# Patient Record
Sex: FEMALE | Race: BLACK OR AFRICAN AMERICAN
[De-identification: names, ages, dates, MRNs, and addresses within clinical notes are randomized per-mention and may not be internally consistent; named-entity substitution may affect disease eponyms.]

---

## 2017-04-16 ENCOUNTER — HOSPITAL ENCOUNTER (EMERGENCY)
Dept: HOSPITAL 12 - ER | Age: 57
Discharge: HOME | End: 2017-04-16
Payer: COMMERCIAL

## 2017-04-16 VITALS — WEIGHT: 190 LBS | HEIGHT: 64 IN | BODY MASS INDEX: 32.44 KG/M2
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
## 2017-04-16 DIAGNOSIS — M62.838: ICD-10-CM
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
## 2017-04-16 DIAGNOSIS — S40.011A: Primary | ICD-10-CM

## 2017-04-16 DIAGNOSIS — S70.01XA: ICD-10-CM
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
## 2017-04-16 PROCEDURE — 99284 EMERGENCY DEPT VISIT MOD MDM: CPT

## 2017-04-16 PROCEDURE — 73030 X-RAY EXAM OF SHOULDER: CPT

## 2017-04-16 PROCEDURE — 96372 THER/PROPH/DIAG INJ SC/IM: CPT

## 2017-04-16 PROCEDURE — A4663 DIALYSIS BLOOD PRESSURE CUFF: HCPCS

## 2017-04-16 PROCEDURE — 73502 X-RAY EXAM HIP UNI 2-3 VIEWS: CPT

## 2017-04-22 ENCOUNTER — HOSPITAL ENCOUNTER (EMERGENCY)
Dept: HOSPITAL 12 - ER | Age: 57
LOS: 1 days | Discharge: HOME | End: 2017-04-23
Payer: COMMERCIAL

## 2017-04-22 VITALS — WEIGHT: 190 LBS | BODY MASS INDEX: 32.44 KG/M2 | HEIGHT: 64 IN

## 2017-04-22 DIAGNOSIS — R30.0: ICD-10-CM

## 2017-04-22 DIAGNOSIS — R50.9: Primary | ICD-10-CM

## 2017-04-22 DIAGNOSIS — R10.9: ICD-10-CM

## 2017-04-22 DIAGNOSIS — R51: ICD-10-CM

## 2017-04-22 DIAGNOSIS — Z90.710: ICD-10-CM

## 2017-04-22 LAB
BASOPHILS # BLD AUTO: 0 K/UL (ref 0–8)
BASOPHILS NFR BLD AUTO: 0.2 % (ref 0–2)
EOSINOPHIL # BLD AUTO: 0 K/UL (ref 0–0.7)
EOSINOPHIL NFR BLD AUTO: 0.4 % (ref 0–7)
ERYTHROCYTE [DISTWIDTH] IN BLOOD BY AUTOMATED COUNT: 13.9 % (ref 12.3–17.7)
HCT VFR BLD AUTO: 36.8 % (ref 31.2–41.9)
HGB BLD-MCNC: 12.8 G/DL (ref 10.9–14.3)
LYMPHOCYTES # BLD AUTO: 0.7 K/UL (ref 20–40)
LYMPHOCYTES NFR BLD AUTO: 9.6 % (ref 20.5–51.5)
MCH RBC QN AUTO: 28.5 UUG (ref 24.7–32.8)
MCHC RBC AUTO-ENTMCNC: 35 G/DL (ref 32.3–35.6)
MCV RBC AUTO: 81.9 FL (ref 75.5–95.3)
MONOCYTES # BLD AUTO: 0.2 K/UL (ref 2–10)
MONOCYTES NFR BLD AUTO: 2.6 % (ref 0–11)
NEUTROPHILS # BLD AUTO: 6.5 K/UL (ref 1.8–8.9)
NEUTROPHILS NFR BLD AUTO: 87.2 % (ref 38.5–71.5)
PLATELET # BLD AUTO: 282 K/UL (ref 179–408)
RBC # BLD AUTO: 4.5 MIL/UL (ref 3.63–4.92)
WBC # BLD AUTO: 7.4 K/UL (ref 3.8–11.8)

## 2017-04-22 PROCEDURE — A4663 DIALYSIS BLOOD PRESSURE CUFF: HCPCS

## 2017-04-22 NOTE — NUR
PT IN THE ROOM, STATED HAD  FEVER TODAY AND WAS NOT FEELING WELL,CALLED 911 AND 
WAS STABLE . PT STILL  NOT FEELING WELL SO THEY CAME TO ER.

## 2017-04-23 VITALS — SYSTOLIC BLOOD PRESSURE: 130 MMHG | DIASTOLIC BLOOD PRESSURE: 62 MMHG

## 2017-04-23 LAB
ALBUMIN SERPL BCG-MCNC: 3.7 G/DL (ref 3.4–5)
ALP SERPL-CCNC: 161 U/L (ref 50–136)
ALT SERPL W/O P-5'-P-CCNC: 64 U/L (ref 14–59)
APPEARANCE UR: CLEAR
AST SERPL-CCNC: 62 U/L (ref 15–37)
BILIRUB DIRECT SERPL-MCNC: < 0.1 MG/DL (ref 0–0.2)
BILIRUB SERPL-MCNC: 0.7 MG/DL (ref 0.2–1)
BILIRUB UR QL STRIP: NEGATIVE
BUN SERPL-MCNC: 19 MG/DL (ref 7–18)
CALCIUM SERPL-MCNC: 9.7 MG/DL (ref 8.5–10.1)
CHLORIDE SERPL-SCNC: 103 MMOL/L (ref 98–107)
CO2 SERPL-SCNC: 25 MMOL/L (ref 21–32)
COLOR UR: YELLOW
CREAT SERPL-MCNC: 0.9 MG/DL (ref 0.6–1.3)
DEPRECATED SQUAMOUS URNS QL MICRO: (no result) /HPF
GFR SERPLBLD BASED ON 1.73 SQ M-ARVRAT: 78 ML/MIN (ref 60–?)
GLUCOSE SERPL-MCNC: 107 MG/DL (ref 74–106)
GLUCOSE UR STRIP-MCNC: NEGATIVE MG/DL
HGB UR QL STRIP: NEGATIVE
KETONES UR STRIP-MCNC: NEGATIVE MG/DL
LEUKOCYTE ESTERASE UR QL STRIP: NEGATIVE
NITRITE UR QL STRIP: NEGATIVE
PH UR STRIP: 6.5 [PH] (ref 5–8)
POTASSIUM SERPL-SCNC: 4.9 MMOL/L (ref 3.5–5.1)
PROT UR QL STRIP: NEGATIVE
RBC #/AREA URNS HPF: (no result) /HPF (ref 0–3)
SODIUM SERPL-SCNC: 139 MMOL/L (ref 136–145)
SP GR UR STRIP: 1.02 (ref 1–1.03)
UROBILINOGEN UR STRIP-MCNC: 0.2 E.U./DL
WBC #/AREA URNS HPF: (no result) /HPF
WBC #/AREA URNS HPF: (no result) /HPF (ref 0–3)
WS STN SPEC: 7.7 G/DL (ref 6.4–8.2)

## 2017-04-23 NOTE — NUR
ANTIBIOTICS GIVEN IV WITHOUT ANY UNTOWARD REACTION.ACI AND AMA FORM  SIGNED BY 
PT, ASSISTED TO DRESS UP,AND WALKED TO THE CAR STEADY  GAIT, FAMILY 
DRIVING...GOING HOME.

## 2017-04-23 NOTE — NUR
URINE  SENT TO LAB AND RESULTS IN.  CAME TO SPEAK TO PT RE-RESULTS,PT REFUSED 
SPINAL TESTING. ANTIBIOTICS WILL BE GIVEN BEFORE GOING HOME, FLUIDS CONTINUES 
AS ORDERED.

## 2017-04-25 ENCOUNTER — HOSPITAL ENCOUNTER (EMERGENCY)
Dept: HOSPITAL 12 - ER | Age: 57
Discharge: HOME | End: 2017-04-25
Payer: COMMERCIAL

## 2017-04-25 VITALS — HEIGHT: 64 IN | BODY MASS INDEX: 34.15 KG/M2 | WEIGHT: 200 LBS

## 2017-04-25 DIAGNOSIS — R51: ICD-10-CM

## 2017-04-25 DIAGNOSIS — M54.9: ICD-10-CM

## 2017-04-25 DIAGNOSIS — Z90.710: ICD-10-CM

## 2017-04-25 DIAGNOSIS — J11.1: Primary | ICD-10-CM

## 2017-04-25 LAB
ALBUMIN SERPL BCG-MCNC: 3.9 G/DL (ref 3.4–5)
ALP SERPL-CCNC: 181 U/L (ref 50–136)
ALT SERPL W/O P-5'-P-CCNC: 116 U/L (ref 14–59)
APPEARANCE UR: CLEAR
AST SERPL-CCNC: 59 U/L (ref 15–37)
BASOPHILS # BLD AUTO: 0 K/UL (ref 0–8)
BASOPHILS NFR BLD AUTO: 0.6 % (ref 0–2)
BILIRUB DIRECT SERPL-MCNC: 0.1 MG/DL (ref 0–0.2)
BILIRUB SERPL-MCNC: 0.4 MG/DL (ref 0.2–1)
BILIRUB UR QL STRIP: NEGATIVE
BUN SERPL-MCNC: 9 MG/DL (ref 7–18)
CALCIUM SERPL-MCNC: 9.4 MG/DL (ref 8.5–10.1)
CHLORIDE SERPL-SCNC: 103 MMOL/L (ref 98–107)
CO2 SERPL-SCNC: 28 MMOL/L (ref 21–32)
COLOR UR: YELLOW
CREAT SERPL-MCNC: 0.9 MG/DL (ref 0.6–1.3)
DEPRECATED SQUAMOUS URNS QL MICRO: (no result) /HPF
EOSINOPHIL # BLD AUTO: 0.1 K/UL (ref 0–0.7)
EOSINOPHIL NFR BLD AUTO: 1.9 % (ref 0–7)
ERYTHROCYTE [DISTWIDTH] IN BLOOD BY AUTOMATED COUNT: 14 % (ref 12.3–17.7)
GFR SERPLBLD BASED ON 1.73 SQ M-ARVRAT: 78 ML/MIN (ref 60–?)
GLUCOSE SERPL-MCNC: 119 MG/DL (ref 74–106)
GLUCOSE UR STRIP-MCNC: NEGATIVE MG/DL
HCT VFR BLD AUTO: 38.3 % (ref 31.2–41.9)
HGB BLD-MCNC: 12.8 G/DL (ref 10.9–14.3)
HGB UR QL STRIP: NEGATIVE
KETONES UR STRIP-MCNC: NEGATIVE MG/DL
LEUKOCYTE ESTERASE UR QL STRIP: NEGATIVE
LYMPHOCYTES # BLD AUTO: 2 K/UL (ref 20–40)
LYMPHOCYTES NFR BLD AUTO: 28.9 % (ref 20.5–51.5)
MCH RBC QN AUTO: 28.3 UUG (ref 24.7–32.8)
MCHC RBC AUTO-ENTMCNC: 34 G/DL (ref 32.3–35.6)
MCV RBC AUTO: 84.5 FL (ref 75.5–95.3)
MONOCYTES # BLD AUTO: 0.4 K/UL (ref 2–10)
MONOCYTES NFR BLD AUTO: 5.3 % (ref 0–11)
NEUTROPHILS # BLD AUTO: 4.4 K/UL (ref 1.8–8.9)
NEUTROPHILS NFR BLD AUTO: 63.3 % (ref 38.5–71.5)
NITRITE UR QL STRIP: NEGATIVE
PH UR STRIP: 6 [PH] (ref 5–8)
PLATELET # BLD AUTO: 264 K/UL (ref 179–408)
POTASSIUM SERPL-SCNC: 4 MMOL/L (ref 3.5–5.1)
PROT UR QL STRIP: NEGATIVE
RBC # BLD AUTO: 4.53 MIL/UL (ref 3.63–4.92)
RBC #/AREA URNS HPF: (no result) /HPF (ref 0–3)
SODIUM SERPL-SCNC: 141 MMOL/L (ref 136–145)
SP GR UR STRIP: 1.01 (ref 1–1.03)
UROBILINOGEN UR STRIP-MCNC: 0.2 E.U./DL
WBC # BLD AUTO: 6.9 K/UL (ref 3.8–11.8)
WBC #/AREA URNS HPF: (no result) /HPF
WBC #/AREA URNS HPF: (no result) /HPF (ref 0–3)
WS STN SPEC: 8 G/DL (ref 6.4–8.2)

## 2017-04-25 PROCEDURE — A4663 DIALYSIS BLOOD PRESSURE CUFF: HCPCS

## 2017-04-25 NOTE — NUR
IV removed.  Catheter intact and site benign. Pressure and 4x4 gauze applied to 
site. No bleeding noted.

## 2017-04-25 NOTE — NUR
Patient discharged to home in stable conditon.  Written and verbal after care 
instructions given to patient and patient's family friend. Patient & friend 
verbalized understanding of instructions.

## 2018-03-29 ENCOUNTER — HOSPITAL ENCOUNTER (EMERGENCY)
Dept: HOSPITAL 12 - ER | Age: 58
Discharge: HOME | End: 2018-03-29
Payer: COMMERCIAL

## 2018-03-29 VITALS — WEIGHT: 189 LBS | BODY MASS INDEX: 31.49 KG/M2 | HEIGHT: 65 IN

## 2018-03-29 VITALS — DIASTOLIC BLOOD PRESSURE: 73 MMHG | SYSTOLIC BLOOD PRESSURE: 120 MMHG

## 2018-03-29 DIAGNOSIS — Z90.710: ICD-10-CM

## 2018-03-29 DIAGNOSIS — S49.92XA: Primary | ICD-10-CM
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
## 2018-03-29 LAB
ALP SERPL-CCNC: 169 U/L (ref 50–136)
ALT SERPL W/O P-5'-P-CCNC: 66 U/L (ref 14–59)
AST SERPL-CCNC: 53 U/L (ref 15–37)
BASOPHILS # BLD AUTO: 0.1 K/UL (ref 0–8)
BASOPHILS NFR BLD AUTO: 0.8 % (ref 0–2)
BILIRUB DIRECT SERPL-MCNC: 0.1 MG/DL (ref 0–0.2)
BILIRUB SERPL-MCNC: 0.3 MG/DL (ref 0.2–1)
BUN SERPL-MCNC: 22 MG/DL (ref 7–18)
CHLORIDE SERPL-SCNC: 104 MMOL/L (ref 98–107)
CO2 SERPL-SCNC: 26 MMOL/L (ref 21–32)
CREAT SERPL-MCNC: 1 MG/DL (ref 0.6–1.3)
EOSINOPHIL # BLD AUTO: 0.2 K/UL (ref 0–0.7)
EOSINOPHIL NFR BLD AUTO: 3.4 % (ref 0–7)
GLUCOSE SERPL-MCNC: 131 MG/DL (ref 74–106)
HCT VFR BLD AUTO: 36 % (ref 31.2–41.9)
HGB BLD-MCNC: 12 G/DL (ref 10.9–14.3)
LYMPHOCYTES # BLD AUTO: 3.3 K/UL (ref 20–40)
LYMPHOCYTES NFR BLD AUTO: 46 % (ref 20.5–51.5)
MCH RBC QN AUTO: 28.4 UUG (ref 24.7–32.8)
MCHC RBC AUTO-ENTMCNC: 33 G/DL (ref 32.3–35.6)
MCV RBC AUTO: 85.6 FL (ref 75.5–95.3)
MONOCYTES # BLD AUTO: 0.4 K/UL (ref 2–10)
MONOCYTES NFR BLD AUTO: 6.1 % (ref 0–11)
NEUTROPHILS # BLD AUTO: 3.1 K/UL (ref 1.8–8.9)
NEUTROPHILS NFR BLD AUTO: 43.7 % (ref 38.5–71.5)
PLATELET # BLD AUTO: 303 K/UL (ref 179–408)
POTASSIUM SERPL-SCNC: 3.9 MMOL/L (ref 3.5–5.1)
RBC # BLD AUTO: 4.2 MIL/UL (ref 3.63–4.92)
WBC # BLD AUTO: 7.1 K/UL (ref 3.8–11.8)
WS STN SPEC: 7.5 G/DL (ref 6.4–8.2)

## 2018-03-29 PROCEDURE — A4663 DIALYSIS BLOOD PRESSURE CUFF: HCPCS

## 2018-03-29 NOTE — NUR
Patient discharged to home in stable conditon.  Written and verbal after care 
instructions given. 

Patient verbalizes understanding of instructions. IV removed, catheter intact. 
pressure applies. No bleeding noted at site.

## 2019-02-08 ENCOUNTER — HOSPITAL ENCOUNTER (EMERGENCY)
Dept: HOSPITAL 54 - ER | Age: 59
Discharge: HOME | End: 2019-02-08
Payer: COMMERCIAL

## 2019-02-08 VITALS — HEIGHT: 64 IN | WEIGHT: 200 LBS | BODY MASS INDEX: 34.15 KG/M2

## 2019-02-08 VITALS — DIASTOLIC BLOOD PRESSURE: 85 MMHG | SYSTOLIC BLOOD PRESSURE: 141 MMHG

## 2019-02-08 DIAGNOSIS — Z90.710: ICD-10-CM

## 2019-02-08 DIAGNOSIS — M54.41: Primary | ICD-10-CM
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
## 2019-02-08 DIAGNOSIS — Z90.49: ICD-10-CM
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
## 2019-02-08 PROCEDURE — 99283 EMERGENCY DEPT VISIT LOW MDM: CPT

## 2019-02-08 PROCEDURE — 96372 THER/PROPH/DIAG INJ SC/IM: CPT

## 2019-04-02 ENCOUNTER — HOSPITAL ENCOUNTER (EMERGENCY)
Dept: HOSPITAL 54 - ER | Age: 59
LOS: 1 days | Discharge: HOME | End: 2019-04-03
Payer: MEDICARE

## 2019-04-02 VITALS — HEIGHT: 64 IN | BODY MASS INDEX: 33.97 KG/M2 | WEIGHT: 199 LBS
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
## 2019-04-02 DIAGNOSIS — M54.42: ICD-10-CM

## 2019-04-02 DIAGNOSIS — S16.1XXA: Primary | ICD-10-CM
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
## 2019-04-02 DIAGNOSIS — M62.838: ICD-10-CM

## 2019-04-02 DIAGNOSIS — R51: ICD-10-CM

## 2019-04-02 DIAGNOSIS — Z90.49: ICD-10-CM
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
## 2019-04-02 DIAGNOSIS — Z90.710: ICD-10-CM

## 2019-04-02 PROCEDURE — 99283 EMERGENCY DEPT VISIT LOW MDM: CPT

## 2019-04-02 PROCEDURE — 96372 THER/PROPH/DIAG INJ SC/IM: CPT

## 2019-04-03 VITALS — DIASTOLIC BLOOD PRESSURE: 75 MMHG | SYSTOLIC BLOOD PRESSURE: 134 MMHG

## 2019-05-24 ENCOUNTER — HOSPITAL ENCOUNTER (EMERGENCY)
Dept: HOSPITAL 54 - ER | Age: 59
Discharge: HOME | End: 2019-05-24
Payer: MEDICARE

## 2019-05-24 VITALS — SYSTOLIC BLOOD PRESSURE: 137 MMHG | DIASTOLIC BLOOD PRESSURE: 77 MMHG

## 2019-05-24 VITALS — BODY MASS INDEX: 34.15 KG/M2 | HEIGHT: 64 IN | WEIGHT: 200 LBS

## 2019-05-24 DIAGNOSIS — Z90.710: ICD-10-CM

## 2019-05-24 DIAGNOSIS — Z90.49: ICD-10-CM

## 2019-05-24 DIAGNOSIS — M54.42: Primary | ICD-10-CM

## 2019-05-24 PROCEDURE — 96372 THER/PROPH/DIAG INJ SC/IM: CPT

## 2019-05-24 PROCEDURE — 99283 EMERGENCY DEPT VISIT LOW MDM: CPT

## 2019-06-11 ENCOUNTER — HOSPITAL ENCOUNTER (EMERGENCY)
Dept: HOSPITAL 54 - ER | Age: 59
Discharge: HOME | End: 2019-06-11
Payer: MEDICARE

## 2019-06-11 VITALS — HEIGHT: 64 IN | BODY MASS INDEX: 37.56 KG/M2 | WEIGHT: 220 LBS

## 2019-06-11 VITALS — SYSTOLIC BLOOD PRESSURE: 138 MMHG | DIASTOLIC BLOOD PRESSURE: 72 MMHG

## 2019-06-11 DIAGNOSIS — G89.29: ICD-10-CM

## 2019-06-11 DIAGNOSIS — E66.9: ICD-10-CM

## 2019-06-11 DIAGNOSIS — M54.41: Primary | ICD-10-CM

## 2019-06-11 DIAGNOSIS — F13.20: ICD-10-CM

## 2019-06-11 DIAGNOSIS — M54.42: ICD-10-CM

## 2019-06-11 PROCEDURE — 96372 THER/PROPH/DIAG INJ SC/IM: CPT

## 2019-06-11 PROCEDURE — 99283 EMERGENCY DEPT VISIT LOW MDM: CPT

## 2019-08-21 ENCOUNTER — HOSPITAL ENCOUNTER (EMERGENCY)
Dept: HOSPITAL 54 - ER | Age: 59
Discharge: HOME | End: 2019-08-21
Payer: MEDICARE

## 2019-08-21 VITALS — BODY MASS INDEX: 34.15 KG/M2 | WEIGHT: 200 LBS | HEIGHT: 64 IN

## 2019-08-21 VITALS — SYSTOLIC BLOOD PRESSURE: 121 MMHG | DIASTOLIC BLOOD PRESSURE: 84 MMHG
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
## 2019-08-21 DIAGNOSIS — Z90.710: ICD-10-CM

## 2019-08-21 DIAGNOSIS — M54.2: ICD-10-CM

## 2019-08-21 DIAGNOSIS — Z90.49: ICD-10-CM
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
## 2019-08-21 DIAGNOSIS — S09.8XXA: Primary | ICD-10-CM

## 2019-08-21 DIAGNOSIS — M25.511: ICD-10-CM
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
## 2019-10-16 NOTE — NUR
PT ADVISED TO GO TO HER DR AS SOON AS POSS FOR FOLLOW-UP AND IF SYMPTOMS 
CONTINUES OR WORSE TO COME BACK.PT VERBALIZED UNDERSTANDING BEFORE GOING HOME. General

## 2020-03-29 ENCOUNTER — HOSPITAL ENCOUNTER (EMERGENCY)
Dept: HOSPITAL 54 - ER | Age: 60
Discharge: HOME | End: 2020-03-29
Payer: MEDICARE

## 2020-03-29 VITALS — BODY MASS INDEX: 41.15 KG/M2 | WEIGHT: 241 LBS | HEIGHT: 64 IN

## 2020-03-29 VITALS — DIASTOLIC BLOOD PRESSURE: 92 MMHG | SYSTOLIC BLOOD PRESSURE: 149 MMHG

## 2020-03-29 DIAGNOSIS — Z90.710: ICD-10-CM

## 2020-03-29 DIAGNOSIS — Z90.49: ICD-10-CM

## 2020-03-29 DIAGNOSIS — M54.30: Primary | ICD-10-CM

## 2020-03-29 DIAGNOSIS — G89.29: ICD-10-CM

## 2020-03-29 PROCEDURE — 96372 THER/PROPH/DIAG INJ SC/IM: CPT

## 2020-03-29 PROCEDURE — 99284 EMERGENCY DEPT VISIT MOD MDM: CPT

## 2020-04-18 ENCOUNTER — HOSPITAL ENCOUNTER (EMERGENCY)
Dept: HOSPITAL 54 - ER | Age: 60
Discharge: HOME | End: 2020-04-18
Payer: MEDICARE

## 2020-04-18 VITALS — DIASTOLIC BLOOD PRESSURE: 95 MMHG | SYSTOLIC BLOOD PRESSURE: 152 MMHG

## 2020-04-18 VITALS — HEIGHT: 64 IN | WEIGHT: 200 LBS | BODY MASS INDEX: 34.15 KG/M2

## 2020-04-18 DIAGNOSIS — J06.9: Primary | ICD-10-CM

## 2020-04-18 DIAGNOSIS — Z90.49: ICD-10-CM

## 2020-04-18 DIAGNOSIS — Z90.710: ICD-10-CM

## 2020-07-16 ENCOUNTER — HOSPITAL ENCOUNTER (EMERGENCY)
Dept: HOSPITAL 54 - ER | Age: 60
Discharge: HOME | End: 2020-07-16
Payer: MEDICARE

## 2020-07-16 VITALS — SYSTOLIC BLOOD PRESSURE: 153 MMHG | DIASTOLIC BLOOD PRESSURE: 78 MMHG

## 2020-07-16 VITALS — HEIGHT: 64 IN | WEIGHT: 200 LBS | BODY MASS INDEX: 34.15 KG/M2

## 2020-07-16 DIAGNOSIS — Z98.890: ICD-10-CM

## 2020-07-16 DIAGNOSIS — Z90.49: ICD-10-CM

## 2020-07-16 DIAGNOSIS — A09: Primary | ICD-10-CM

## 2020-07-16 DIAGNOSIS — R11.2: ICD-10-CM

## 2020-07-16 DIAGNOSIS — Z60.2: ICD-10-CM

## 2020-07-16 LAB
ALBUMIN SERPL BCP-MCNC: 3.7 G/DL (ref 3.4–5)
ALP SERPL-CCNC: 153 U/L (ref 46–116)
ALT SERPL W P-5'-P-CCNC: 50 U/L (ref 12–78)
AST SERPL W P-5'-P-CCNC: 35 U/L (ref 15–37)
BASOPHILS # BLD AUTO: 0 /CMM (ref 0–0.2)
BASOPHILS NFR BLD AUTO: 0.4 % (ref 0–2)
BILIRUB DIRECT SERPL-MCNC: 0.2 MG/DL (ref 0–0.2)
BILIRUB SERPL-MCNC: 0.5 MG/DL (ref 0.2–1)
BUN SERPL-MCNC: 15 MG/DL (ref 7–18)
CALCIUM SERPL-MCNC: 8.7 MG/DL (ref 8.5–10.1)
CHLORIDE SERPL-SCNC: 99 MMOL/L (ref 98–107)
CO2 SERPL-SCNC: 26 MMOL/L (ref 21–32)
CREAT SERPL-MCNC: 1.1 MG/DL (ref 0.6–1.3)
EOSINOPHIL NFR BLD AUTO: 0.3 % (ref 0–6)
GLUCOSE SERPL-MCNC: 134 MG/DL (ref 74–106)
HCT VFR BLD AUTO: 40 % (ref 33–45)
HGB BLD-MCNC: 13.1 G/DL (ref 11.5–14.8)
LIPASE SERPL-CCNC: 66 U/L (ref 73–393)
LYMPHOCYTES NFR BLD AUTO: 1.1 /CMM (ref 0.8–4.8)
LYMPHOCYTES NFR BLD AUTO: 12.9 % (ref 20–44)
MCHC RBC AUTO-ENTMCNC: 33 G/DL (ref 31–36)
MCV RBC AUTO: 86 FL (ref 82–100)
MONOCYTES NFR BLD AUTO: 0.7 /CMM (ref 0.1–1.3)
MONOCYTES NFR BLD AUTO: 8.1 % (ref 2–12)
NEUTROPHILS # BLD AUTO: 6.5 /CMM (ref 1.8–8.9)
NEUTROPHILS NFR BLD AUTO: 78.3 % (ref 43–81)
PLATELET # BLD AUTO: 269 /CMM (ref 150–450)
POTASSIUM SERPL-SCNC: 3.6 MMOL/L (ref 3.5–5.1)
PROT SERPL-MCNC: 7.6 G/DL (ref 6.4–8.2)
RBC # BLD AUTO: 4.61 MIL/UL (ref 4–5.2)
SODIUM SERPL-SCNC: 134 MMOL/L (ref 136–145)
WBC NRBC COR # BLD AUTO: 8.3 K/UL (ref 4.3–11)

## 2020-07-16 PROCEDURE — 83690 ASSAY OF LIPASE: CPT

## 2020-07-16 PROCEDURE — 85025 COMPLETE CBC W/AUTO DIFF WBC: CPT

## 2020-07-16 PROCEDURE — 96375 TX/PRO/DX INJ NEW DRUG ADDON: CPT

## 2020-07-16 PROCEDURE — 74176 CT ABD & PELVIS W/O CONTRAST: CPT

## 2020-07-16 PROCEDURE — 99284 EMERGENCY DEPT VISIT MOD MDM: CPT

## 2020-07-16 PROCEDURE — 80048 BASIC METABOLIC PNL TOTAL CA: CPT

## 2020-07-16 PROCEDURE — 96374 THER/PROPH/DIAG INJ IV PUSH: CPT

## 2020-07-16 PROCEDURE — 80076 HEPATIC FUNCTION PANEL: CPT

## 2020-07-16 RX ADMIN — DEXTROSE MONOHYDRATE ONE MG: 50 INJECTION, SOLUTION INTRAVENOUS at 11:47

## 2020-07-16 RX ADMIN — HYDROMORPHONE HYDROCHLORIDE ONE MG: 1 INJECTION, SOLUTION INTRAMUSCULAR; INTRAVENOUS; SUBCUTANEOUS at 13:32

## 2020-07-16 RX ADMIN — SODIUM CHLORIDE ONE ML: 9 INJECTION, SOLUTION INTRAVENOUS at 11:46

## 2020-07-16 SDOH — SOCIAL STABILITY - SOCIAL INSECURITY: PROBLEMS RELATED TO LIVING ALONE: Z60.2

## 2021-05-30 ENCOUNTER — HOSPITAL ENCOUNTER (EMERGENCY)
Dept: HOSPITAL 54 - ER | Age: 61
Discharge: HOME | End: 2021-05-30
Payer: MEDICARE

## 2021-05-30 VITALS — SYSTOLIC BLOOD PRESSURE: 128 MMHG | DIASTOLIC BLOOD PRESSURE: 73 MMHG

## 2021-05-30 VITALS — BODY MASS INDEX: 39.27 KG/M2 | WEIGHT: 230 LBS | HEIGHT: 64 IN

## 2021-05-30 DIAGNOSIS — R35.0: ICD-10-CM

## 2021-05-30 DIAGNOSIS — R14.0: ICD-10-CM

## 2021-05-30 DIAGNOSIS — Z90.49: ICD-10-CM

## 2021-05-30 DIAGNOSIS — Z79.899: ICD-10-CM

## 2021-05-30 DIAGNOSIS — R10.30: Primary | ICD-10-CM

## 2021-05-30 DIAGNOSIS — Z60.2: ICD-10-CM

## 2021-05-30 DIAGNOSIS — Z98.890: ICD-10-CM

## 2021-05-30 LAB
ALBUMIN SERPL BCP-MCNC: 3.6 G/DL (ref 3.4–5)
ALP SERPL-CCNC: 132 U/L (ref 46–116)
ALT SERPL W P-5'-P-CCNC: 52 U/L (ref 12–78)
AST SERPL W P-5'-P-CCNC: 32 U/L (ref 15–37)
BASOPHILS # BLD AUTO: 0 /CMM (ref 0–0.2)
BASOPHILS NFR BLD AUTO: 0.5 % (ref 0–2)
BILIRUB DIRECT SERPL-MCNC: 0.1 MG/DL (ref 0–0.2)
BILIRUB SERPL-MCNC: 0.4 MG/DL (ref 0.2–1)
BILIRUB UR QL STRIP: NEGATIVE
BUN SERPL-MCNC: 18 MG/DL (ref 7–18)
CALCIUM SERPL-MCNC: 9.1 MG/DL (ref 8.5–10.1)
CHLORIDE SERPL-SCNC: 103 MMOL/L (ref 98–107)
CO2 SERPL-SCNC: 27 MMOL/L (ref 21–32)
COLOR UR: YELLOW
CREAT SERPL-MCNC: 0.9 MG/DL (ref 0.6–1.3)
EOSINOPHIL NFR BLD AUTO: 2.9 % (ref 0–6)
GLUCOSE SERPL-MCNC: 93 MG/DL (ref 74–106)
GLUCOSE UR STRIP-MCNC: NEGATIVE MG/DL
HCT VFR BLD AUTO: 38 % (ref 33–45)
HGB BLD-MCNC: 12.3 G/DL (ref 11.5–14.8)
LEUKOCYTE ESTERASE UR QL STRIP: NEGATIVE
LIPASE SERPL-CCNC: 72 U/L (ref 73–393)
LYMPHOCYTES NFR BLD AUTO: 3.2 /CMM (ref 0.8–4.8)
LYMPHOCYTES NFR BLD AUTO: 42.8 % (ref 20–44)
MCHC RBC AUTO-ENTMCNC: 33 G/DL (ref 31–36)
MCV RBC AUTO: 88 FL (ref 82–100)
MONOCYTES NFR BLD AUTO: 0.7 /CMM (ref 0.1–1.3)
MONOCYTES NFR BLD AUTO: 9.7 % (ref 2–12)
NEUTROPHILS # BLD AUTO: 3.3 /CMM (ref 1.8–8.9)
NEUTROPHILS NFR BLD AUTO: 44.1 % (ref 43–81)
NITRITE UR QL STRIP: NEGATIVE
PH UR STRIP: 6.5 [PH] (ref 5–8)
PLATELET # BLD AUTO: 330 /CMM (ref 150–450)
POTASSIUM SERPL-SCNC: 3.9 MMOL/L (ref 3.5–5.1)
PROT SERPL-MCNC: 7.5 G/DL (ref 6.4–8.2)
PROT UR QL STRIP: NEGATIVE MG/DL
RBC # BLD AUTO: 4.28 MIL/UL (ref 4–5.2)
SODIUM SERPL-SCNC: 140 MMOL/L (ref 136–145)
UROBILINOGEN UR STRIP-MCNC: 0.2 EU/DL
WBC NRBC COR # BLD AUTO: 7.6 K/UL (ref 4.3–11)

## 2021-05-30 PROCEDURE — 99285 EMERGENCY DEPT VISIT HI MDM: CPT

## 2021-05-30 PROCEDURE — 96374 THER/PROPH/DIAG INJ IV PUSH: CPT

## 2021-05-30 PROCEDURE — 81003 URINALYSIS AUTO W/O SCOPE: CPT

## 2021-05-30 PROCEDURE — 80076 HEPATIC FUNCTION PANEL: CPT

## 2021-05-30 PROCEDURE — 84703 CHORIONIC GONADOTROPIN ASSAY: CPT

## 2021-05-30 PROCEDURE — 36415 COLL VENOUS BLD VENIPUNCTURE: CPT

## 2021-05-30 PROCEDURE — 74177 CT ABD & PELVIS W/CONTRAST: CPT

## 2021-05-30 PROCEDURE — 83690 ASSAY OF LIPASE: CPT

## 2021-05-30 PROCEDURE — 96361 HYDRATE IV INFUSION ADD-ON: CPT

## 2021-05-30 PROCEDURE — 83605 ASSAY OF LACTIC ACID: CPT

## 2021-05-30 PROCEDURE — 85025 COMPLETE CBC W/AUTO DIFF WBC: CPT

## 2021-05-30 PROCEDURE — 80048 BASIC METABOLIC PNL TOTAL CA: CPT

## 2021-05-30 SDOH — SOCIAL STABILITY - SOCIAL INSECURITY: PROBLEMS RELATED TO LIVING ALONE: Z60.2

## 2021-11-09 ENCOUNTER — HOSPITAL ENCOUNTER (EMERGENCY)
Dept: HOSPITAL 54 - ER | Age: 61
Discharge: HOME | End: 2021-11-09
Payer: MEDICARE

## 2021-11-09 VITALS — WEIGHT: 190 LBS | HEIGHT: 64 IN | BODY MASS INDEX: 32.44 KG/M2

## 2021-11-09 VITALS — SYSTOLIC BLOOD PRESSURE: 127 MMHG | DIASTOLIC BLOOD PRESSURE: 82 MMHG

## 2021-11-09 DIAGNOSIS — Z60.2: ICD-10-CM
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
## 2021-11-09 DIAGNOSIS — Z79.899: ICD-10-CM
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
## 2021-11-09 DIAGNOSIS — Z90.49: ICD-10-CM
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
## 2021-11-09 DIAGNOSIS — S89.81XA: Primary | ICD-10-CM
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
## 2021-11-09 DIAGNOSIS — Z98.890: ICD-10-CM

## 2021-11-09 PROCEDURE — 96372 THER/PROPH/DIAG INJ SC/IM: CPT

## 2021-11-09 PROCEDURE — 29505 APPLICATION LONG LEG SPLINT: CPT

## 2021-11-09 PROCEDURE — 73564 X-RAY EXAM KNEE 4 OR MORE: CPT

## 2021-11-09 PROCEDURE — 99284 EMERGENCY DEPT VISIT MOD MDM: CPT

## 2021-11-09 SDOH — SOCIAL STABILITY - SOCIAL INSECURITY: PROBLEMS RELATED TO LIVING ALONE: Z60.2

## 2023-03-14 ENCOUNTER — HOSPITAL ENCOUNTER (EMERGENCY)
Dept: HOSPITAL 54 - ER | Age: 63
Discharge: HOME | End: 2023-03-14
Payer: MEDICARE

## 2023-03-14 VITALS — SYSTOLIC BLOOD PRESSURE: 159 MMHG | DIASTOLIC BLOOD PRESSURE: 70 MMHG

## 2023-03-14 VITALS — BODY MASS INDEX: 32.44 KG/M2 | WEIGHT: 190 LBS | HEIGHT: 64 IN

## 2023-03-14 DIAGNOSIS — J40: Primary | ICD-10-CM

## 2023-03-14 DIAGNOSIS — Z79.899: ICD-10-CM

## 2023-03-14 DIAGNOSIS — Z60.2: ICD-10-CM

## 2023-03-14 DIAGNOSIS — Z90.49: ICD-10-CM

## 2023-03-14 PROCEDURE — 96372 THER/PROPH/DIAG INJ SC/IM: CPT

## 2023-03-14 PROCEDURE — 99283 EMERGENCY DEPT VISIT LOW MDM: CPT

## 2023-03-14 PROCEDURE — 94640 AIRWAY INHALATION TREATMENT: CPT

## 2023-03-14 PROCEDURE — 71045 X-RAY EXAM CHEST 1 VIEW: CPT

## 2023-03-14 SDOH — SOCIAL STABILITY - SOCIAL INSECURITY: PROBLEMS RELATED TO LIVING ALONE: Z60.2

## 2023-08-07 ENCOUNTER — HOSPITAL ENCOUNTER (INPATIENT)
Dept: HOSPITAL 54 - ER | Age: 63
LOS: 3 days | Discharge: HOME HEALTH | DRG: 69 | End: 2023-08-10
Attending: NURSE PRACTITIONER | Admitting: NURSE PRACTITIONER
Payer: MEDICARE

## 2023-08-07 VITALS — WEIGHT: 240 LBS | BODY MASS INDEX: 38.57 KG/M2 | HEIGHT: 66 IN

## 2023-08-07 DIAGNOSIS — Z86.73: ICD-10-CM

## 2023-08-07 DIAGNOSIS — M54.32: ICD-10-CM

## 2023-08-07 DIAGNOSIS — Z90.49: ICD-10-CM

## 2023-08-07 DIAGNOSIS — Z79.899: ICD-10-CM

## 2023-08-07 DIAGNOSIS — R29.702: ICD-10-CM

## 2023-08-07 DIAGNOSIS — E78.5: ICD-10-CM

## 2023-08-07 DIAGNOSIS — G89.4: ICD-10-CM

## 2023-08-07 DIAGNOSIS — K59.00: ICD-10-CM

## 2023-08-07 DIAGNOSIS — E66.01: ICD-10-CM

## 2023-08-07 DIAGNOSIS — Z90.711: ICD-10-CM

## 2023-08-07 DIAGNOSIS — M54.31: ICD-10-CM

## 2023-08-07 DIAGNOSIS — Z79.51: ICD-10-CM

## 2023-08-07 DIAGNOSIS — G45.9: Primary | ICD-10-CM

## 2023-08-07 LAB
APTT PPP: 23.2 SEC (ref 24.3–34.3)
BASOPHILS # BLD AUTO: 0.1 K/UL (ref 0–0.2)
BASOPHILS NFR BLD AUTO: 1.2 % (ref 0–2)
BUN SERPL-MCNC: 17 MG/DL (ref 7–18)
CALCIUM SERPL-MCNC: 9.8 MG/DL (ref 8.5–10.1)
CHLORIDE SERPL-SCNC: 104 MMOL/L (ref 98–107)
CO2 SERPL-SCNC: 26 MMOL/L (ref 21–32)
CREAT SERPL-MCNC: 0.9 MG/DL (ref 0.6–1.3)
EOSINOPHIL # BLD AUTO: 0.3 K/UL (ref 0–0.7)
EOSINOPHIL NFR BLD AUTO: 3.1 % (ref 0–6)
ERYTHROCYTE [DISTWIDTH] IN BLOOD BY AUTOMATED COUNT: 14.6 % (ref 11.5–15)
GLUCOSE SERPL-MCNC: 115 MG/DL (ref 74–106)
HCT VFR BLD AUTO: 39 % (ref 33–45)
HGB BLD-MCNC: 12.8 G/DL (ref 11.5–14.8)
INR PPP: 0.93 (ref 0.91–1.1)
LYMPHOCYTES NFR BLD AUTO: 3.5 K/UL (ref 0.8–4.8)
LYMPHOCYTES NFR BLD AUTO: 41.1 % (ref 20–44)
MCH RBC QN AUTO: 28 PG (ref 26–33)
MCHC RBC AUTO-ENTMCNC: 33 G/DL (ref 31–36)
MCV RBC AUTO: 85 FL (ref 82–100)
MONOCYTES NFR BLD AUTO: 0.6 K/UL (ref 0.1–1.3)
MONOCYTES NFR BLD AUTO: 7.1 % (ref 2–12)
NEUTROPHILS # BLD AUTO: 4 K/UL (ref 1.8–8.9)
NEUTROPHILS NFR BLD AUTO: 47.5 % (ref 43–81)
PLATELET # BLD AUTO: 348 K/UL (ref 150–450)
POTASSIUM SERPL-SCNC: 4 MMOL/L (ref 3.5–5.1)
PROTHROMBIN TIME: 9.8 SECS (ref 9.2–11.1)
RBC # BLD AUTO: 4.53 MIL/UL (ref 4–5.2)
SODIUM SERPL-SCNC: 142 MMOL/L (ref 136–145)
WBC NRBC COR # BLD AUTO: 8.4 K/UL (ref 4.3–11)

## 2023-08-07 PROCEDURE — G0378 HOSPITAL OBSERVATION PER HR: HCPCS

## 2023-08-08 VITALS — TEMPERATURE: 97.3 F | SYSTOLIC BLOOD PRESSURE: 113 MMHG | OXYGEN SATURATION: 100 % | DIASTOLIC BLOOD PRESSURE: 50 MMHG

## 2023-08-08 VITALS — OXYGEN SATURATION: 99 % | DIASTOLIC BLOOD PRESSURE: 67 MMHG | SYSTOLIC BLOOD PRESSURE: 132 MMHG | TEMPERATURE: 97.9 F

## 2023-08-08 VITALS — DIASTOLIC BLOOD PRESSURE: 62 MMHG | SYSTOLIC BLOOD PRESSURE: 113 MMHG | TEMPERATURE: 97.3 F | OXYGEN SATURATION: 99 %

## 2023-08-08 VITALS — DIASTOLIC BLOOD PRESSURE: 59 MMHG | TEMPERATURE: 98 F | SYSTOLIC BLOOD PRESSURE: 136 MMHG | OXYGEN SATURATION: 100 %

## 2023-08-08 VITALS — TEMPERATURE: 98.1 F | DIASTOLIC BLOOD PRESSURE: 81 MMHG | SYSTOLIC BLOOD PRESSURE: 137 MMHG | OXYGEN SATURATION: 100 %

## 2023-08-08 VITALS — OXYGEN SATURATION: 100 % | TEMPERATURE: 98.1 F | DIASTOLIC BLOOD PRESSURE: 81 MMHG | SYSTOLIC BLOOD PRESSURE: 137 MMHG

## 2023-08-08 VITALS — TEMPERATURE: 97.7 F | SYSTOLIC BLOOD PRESSURE: 128 MMHG | OXYGEN SATURATION: 98 % | DIASTOLIC BLOOD PRESSURE: 65 MMHG

## 2023-08-08 LAB
AMPHETAMINES UR QL: NEGATIVE
APPEARANCE UR: CLEAR
BARBITURATES UR QL SCN: NEGATIVE
BASOPHILS # BLD AUTO: 0 K/UL (ref 0–0.2)
BASOPHILS NFR BLD AUTO: 0.5 % (ref 0–2)
BENZODIAZ UR QL SCN: NEGATIVE
BILIRUB UR QL STRIP: NEGATIVE
BUN SERPL-MCNC: 17 MG/DL (ref 7–18)
CALCIUM SERPL-MCNC: 9.3 MG/DL (ref 8.5–10.1)
CANNABINOIDS UR QL SCN: NEGATIVE
CHLORIDE SERPL-SCNC: 103 MMOL/L (ref 98–107)
CHOLEST SERPL-MCNC: 281 MG/DL (ref ?–200)
CO2 SERPL-SCNC: 26 MMOL/L (ref 21–32)
COCAINE UR QL SCN: NEGATIVE
COLOR UR: YELLOW
CREAT SERPL-MCNC: 0.8 MG/DL (ref 0.6–1.3)
EOSINOPHIL # BLD AUTO: 0.2 K/UL (ref 0–0.7)
EOSINOPHIL NFR BLD AUTO: 3.1 % (ref 0–6)
ERYTHROCYTE [DISTWIDTH] IN BLOOD BY AUTOMATED COUNT: 14.3 % (ref 11.5–15)
GLUCOSE SERPL-MCNC: 108 MG/DL (ref 74–106)
GLUCOSE UR STRIP-MCNC: NEGATIVE MG/DL
HCT VFR BLD AUTO: 37 % (ref 33–45)
HDLC SERPL-MCNC: 69 MG/DL (ref 40–60)
HGB BLD-MCNC: 12 G/DL (ref 11.5–14.8)
HGB UR QL STRIP: NEGATIVE ERY/UL
KETONES UR STRIP-MCNC: NEGATIVE MG/DL
LDLC SERPL DIRECT ASSAY-MCNC: 174 MG/DL (ref 0–99)
LEUKOCYTE ESTERASE UR QL STRIP: NEGATIVE
LYMPHOCYTES NFR BLD AUTO: 3.2 K/UL (ref 0.8–4.8)
LYMPHOCYTES NFR BLD AUTO: 41.9 % (ref 20–44)
MCH RBC QN AUTO: 28 PG (ref 26–33)
MCHC RBC AUTO-ENTMCNC: 33 G/DL (ref 31–36)
MCV RBC AUTO: 86 FL (ref 82–100)
MONOCYTES NFR BLD AUTO: 0.5 K/UL (ref 0.1–1.3)
MONOCYTES NFR BLD AUTO: 7.1 % (ref 2–12)
NEUTROPHILS # BLD AUTO: 3.6 K/UL (ref 1.8–8.9)
NEUTROPHILS NFR BLD AUTO: 47.4 % (ref 43–81)
NITRITE UR QL STRIP: NEGATIVE
OPIATES UR QL SCN: POSITIVE
PCP UR QL SCN: NEGATIVE
PH UR STRIP: 6 [PH] (ref 5–8)
PLATELET # BLD AUTO: 314 K/UL (ref 150–450)
POTASSIUM SERPL-SCNC: 4.2 MMOL/L (ref 3.5–5.1)
PROT UR QL STRIP: NEGATIVE MG/DL
RBC # BLD AUTO: 4.26 MIL/UL (ref 4–5.2)
SODIUM SERPL-SCNC: 140 MMOL/L (ref 136–145)
SP GR UR STRIP: 1.02 (ref 1–1.03)
TRIGL SERPL-MCNC: 105 MG/DL (ref 30–150)
TSH SERPL DL<=0.005 MIU/L-ACNC: 2.26 UIU/ML (ref 0.36–3.74)
UROBILINOGEN UR STRIP-MCNC: 0.2 EU/DL
WBC NRBC COR # BLD AUTO: 7.6 K/UL (ref 4.3–11)

## 2023-08-08 RX ADMIN — Medication SCH EACH: at 11:38

## 2023-08-08 RX ADMIN — Medication PRN TAB: at 03:54

## 2023-08-08 RX ADMIN — Medication SCH EACH: at 11:41

## 2023-08-08 RX ADMIN — SIMVASTATIN SCH MG: 20 TABLET, FILM COATED ORAL at 21:36

## 2023-08-08 RX ADMIN — LACTULOSE SCH G: 10 SOLUTION ORAL at 21:36

## 2023-08-08 RX ADMIN — Medication SCH EACH: at 05:57

## 2023-08-08 RX ADMIN — Medication SCH EACH: at 17:09

## 2023-08-08 RX ADMIN — Medication SCH EACH: at 21:50

## 2023-08-08 RX ADMIN — Medication PRN TAB: at 16:31

## 2023-08-08 RX ADMIN — Medication PRN TAB: at 09:44

## 2023-08-08 RX ADMIN — LACTULOSE SCH G: 10 SOLUTION ORAL at 16:27

## 2023-08-08 RX ADMIN — METHOCARBAMOL TABLETS SCH MG: 750 TABLET, COATED ORAL at 16:27

## 2023-08-08 RX ADMIN — Medication PRN TAB: at 23:02

## 2023-08-09 VITALS — DIASTOLIC BLOOD PRESSURE: 67 MMHG | TEMPERATURE: 98 F | OXYGEN SATURATION: 98 % | SYSTOLIC BLOOD PRESSURE: 130 MMHG

## 2023-08-09 VITALS — OXYGEN SATURATION: 100 % | DIASTOLIC BLOOD PRESSURE: 59 MMHG | TEMPERATURE: 97.4 F | SYSTOLIC BLOOD PRESSURE: 126 MMHG

## 2023-08-09 VITALS — SYSTOLIC BLOOD PRESSURE: 129 MMHG | OXYGEN SATURATION: 97 % | DIASTOLIC BLOOD PRESSURE: 75 MMHG | TEMPERATURE: 98 F

## 2023-08-09 VITALS — OXYGEN SATURATION: 100 % | SYSTOLIC BLOOD PRESSURE: 137 MMHG | TEMPERATURE: 97.9 F | DIASTOLIC BLOOD PRESSURE: 63 MMHG

## 2023-08-09 VITALS — OXYGEN SATURATION: 98 % | DIASTOLIC BLOOD PRESSURE: 78 MMHG | TEMPERATURE: 98.3 F | SYSTOLIC BLOOD PRESSURE: 143 MMHG

## 2023-08-09 RX ADMIN — Medication PRN TAB: at 13:50

## 2023-08-09 RX ADMIN — TEMAZEPAM PRN MG: 15 CAPSULE ORAL at 00:21

## 2023-08-09 RX ADMIN — Medication SCH EACH: at 06:59

## 2023-08-09 RX ADMIN — Medication SCH EACH: at 06:00

## 2023-08-09 RX ADMIN — LACTULOSE SCH G: 10 SOLUTION ORAL at 10:00

## 2023-08-09 RX ADMIN — Medication SCH EACH: at 21:27

## 2023-08-09 RX ADMIN — LACTULOSE SCH G: 10 SOLUTION ORAL at 21:55

## 2023-08-09 RX ADMIN — SIMVASTATIN SCH MG: 20 TABLET, FILM COATED ORAL at 21:05

## 2023-08-09 RX ADMIN — Medication SCH EACH: at 16:59

## 2023-08-09 RX ADMIN — Medication SCH EACH: at 12:00

## 2023-08-09 RX ADMIN — Medication SCH EACH: at 12:39

## 2023-08-09 RX ADMIN — LACTULOSE SCH G: 10 SOLUTION ORAL at 16:45

## 2023-08-09 RX ADMIN — Medication SCH EACH: at 00:00

## 2023-08-09 RX ADMIN — TEMAZEPAM PRN MG: 15 CAPSULE ORAL at 22:14

## 2023-08-09 RX ADMIN — ASPIRIN 81 MG SCH MG: 81 TABLET ORAL at 08:44

## 2023-08-09 RX ADMIN — LACTULOSE SCH G: 10 SOLUTION ORAL at 04:54

## 2023-08-09 RX ADMIN — METHOCARBAMOL TABLETS SCH MG: 750 TABLET, COATED ORAL at 08:44

## 2023-08-09 RX ADMIN — Medication PRN TAB: at 22:15

## 2023-08-09 RX ADMIN — METHOCARBAMOL TABLETS SCH MG: 750 TABLET, COATED ORAL at 16:45

## 2023-08-10 VITALS — TEMPERATURE: 98 F | SYSTOLIC BLOOD PRESSURE: 118 MMHG | OXYGEN SATURATION: 99 % | DIASTOLIC BLOOD PRESSURE: 56 MMHG

## 2023-08-10 RX ADMIN — Medication SCH EACH: at 06:36

## 2023-08-10 RX ADMIN — LACTULOSE SCH G: 10 SOLUTION ORAL at 05:18

## 2023-08-10 RX ADMIN — LACTULOSE SCH G: 10 SOLUTION ORAL at 09:02

## 2023-08-10 RX ADMIN — ASPIRIN 81 MG SCH MG: 81 TABLET ORAL at 09:02

## 2023-08-10 RX ADMIN — Medication PRN TAB: at 09:09

## 2023-08-10 RX ADMIN — METHOCARBAMOL TABLETS SCH MG: 750 TABLET, COATED ORAL at 09:02

## 2024-04-16 ENCOUNTER — HOSPITAL ENCOUNTER (EMERGENCY)
Dept: HOSPITAL 54 - ER | Age: 64
Discharge: HOME | End: 2024-04-16
Payer: MEDICARE

## 2024-04-16 VITALS — HEIGHT: 64 IN | BODY MASS INDEX: 40.97 KG/M2 | WEIGHT: 240 LBS

## 2024-04-16 VITALS — DIASTOLIC BLOOD PRESSURE: 70 MMHG | SYSTOLIC BLOOD PRESSURE: 128 MMHG | TEMPERATURE: 98.3 F | OXYGEN SATURATION: 98 %

## 2024-04-16 DIAGNOSIS — Z60.2: ICD-10-CM

## 2024-04-16 DIAGNOSIS — J32.9: Primary | ICD-10-CM

## 2024-04-16 DIAGNOSIS — Z90.49: ICD-10-CM

## 2024-04-16 DIAGNOSIS — R11.2: ICD-10-CM

## 2024-04-16 DIAGNOSIS — Z79.899: ICD-10-CM

## 2024-04-16 DIAGNOSIS — K85.90: ICD-10-CM

## 2024-04-16 LAB
ALBUMIN SERPL BCP-MCNC: 3.9 G/DL (ref 3.4–5)
ALP SERPL-CCNC: 172 U/L (ref 46–116)
ALT SERPL W P-5'-P-CCNC: 51 U/L (ref 12–78)
AST SERPL W P-5'-P-CCNC: 27 U/L (ref 15–37)
BASOPHILS # BLD AUTO: 0 K/UL (ref 0–0.2)
BASOPHILS NFR BLD AUTO: 0.7 % (ref 0–2)
BILIRUB DIRECT SERPL-MCNC: 0.1 MG/DL (ref 0–0.2)
BILIRUB SERPL-MCNC: 0.4 MG/DL (ref 0.2–1)
BUN SERPL-MCNC: 16 MG/DL (ref 7–18)
CALCIUM SERPL-MCNC: 8.8 MG/DL (ref 8.5–10.1)
CHLORIDE SERPL-SCNC: 102 MMOL/L (ref 98–107)
CO2 SERPL-SCNC: 32 MMOL/L (ref 21–32)
CREAT SERPL-MCNC: 0.8 MG/DL (ref 0.6–1.3)
EOSINOPHIL # BLD AUTO: 0.1 K/UL (ref 0–0.7)
EOSINOPHIL NFR BLD AUTO: 1.7 % (ref 0–6)
ERYTHROCYTE [DISTWIDTH] IN BLOOD BY AUTOMATED COUNT: 14.6 % (ref 11.5–15)
GLUCOSE SERPL-MCNC: 100 MG/DL (ref 74–106)
HCT VFR BLD AUTO: 37 % (ref 33–45)
HGB BLD-MCNC: 12.1 G/DL (ref 11.5–14.8)
LIPASE SERPL-CCNC: 120 U/L (ref 16–77)
LYMPHOCYTES NFR BLD AUTO: 1.7 K/UL (ref 0.8–4.8)
LYMPHOCYTES NFR BLD AUTO: 23.1 % (ref 20–44)
MCH RBC QN AUTO: 27 PG (ref 26–33)
MCHC RBC AUTO-ENTMCNC: 33 G/DL (ref 31–36)
MCV RBC AUTO: 83 FL (ref 82–100)
MONOCYTES NFR BLD AUTO: 0.4 K/UL (ref 0.1–1.3)
MONOCYTES NFR BLD AUTO: 6.1 % (ref 2–12)
NEUTROPHILS # BLD AUTO: 4.9 K/UL (ref 1.8–8.9)
NEUTROPHILS NFR BLD AUTO: 68.4 % (ref 43–81)
PLATELET # BLD AUTO: 368 K/UL (ref 150–450)
POTASSIUM SERPL-SCNC: 3.8 MMOL/L (ref 3.5–5.1)
PROT SERPL-MCNC: 7.7 G/DL (ref 6.4–8.2)
RBC # BLD AUTO: 4.45 MIL/UL (ref 4–5.2)
SODIUM SERPL-SCNC: 137 MMOL/L (ref 136–145)
WBC NRBC COR # BLD AUTO: 7.2 K/UL (ref 4.3–11)

## 2024-04-16 PROCEDURE — 99285 EMERGENCY DEPT VISIT HI MDM: CPT

## 2024-04-16 PROCEDURE — 74176 CT ABD & PELVIS W/O CONTRAST: CPT

## 2024-04-16 PROCEDURE — 36415 COLL VENOUS BLD VENIPUNCTURE: CPT

## 2024-04-16 PROCEDURE — 80048 BASIC METABOLIC PNL TOTAL CA: CPT

## 2024-04-16 PROCEDURE — 96361 HYDRATE IV INFUSION ADD-ON: CPT

## 2024-04-16 PROCEDURE — 71045 X-RAY EXAM CHEST 1 VIEW: CPT

## 2024-04-16 PROCEDURE — 96374 THER/PROPH/DIAG INJ IV PUSH: CPT

## 2024-04-16 PROCEDURE — 85025 COMPLETE CBC W/AUTO DIFF WBC: CPT

## 2024-04-16 PROCEDURE — 80076 HEPATIC FUNCTION PANEL: CPT

## 2024-04-16 PROCEDURE — 83690 ASSAY OF LIPASE: CPT

## 2024-04-16 PROCEDURE — 96375 TX/PRO/DX INJ NEW DRUG ADDON: CPT

## 2024-04-16 RX ADMIN — Medication ONE MG: at 10:55

## 2024-04-16 RX ADMIN — DEXTROSE MONOHYDRATE ONE MG: 50 INJECTION, SOLUTION INTRAVENOUS at 10:56

## 2024-04-16 RX ADMIN — SODIUM CHLORIDE ONE ML: 9 INJECTION, SOLUTION INTRAVENOUS at 10:53

## 2024-04-16 SDOH — SOCIAL STABILITY - SOCIAL INSECURITY: PROBLEMS RELATED TO LIVING ALONE: Z60.2

## 2024-06-13 ENCOUNTER — HOSPITAL ENCOUNTER (EMERGENCY)
Dept: HOSPITAL 54 - ER | Age: 64
Discharge: HOME | End: 2024-06-13
Payer: MEDICARE

## 2024-06-13 VITALS — OXYGEN SATURATION: 98 % | DIASTOLIC BLOOD PRESSURE: 69 MMHG | SYSTOLIC BLOOD PRESSURE: 141 MMHG | TEMPERATURE: 98.5 F

## 2024-06-13 VITALS — BODY MASS INDEX: 41.83 KG/M2 | HEIGHT: 64 IN | WEIGHT: 245 LBS

## 2024-06-13 DIAGNOSIS — R10.30: ICD-10-CM

## 2024-06-13 DIAGNOSIS — Z90.49: ICD-10-CM

## 2024-06-13 DIAGNOSIS — Z90.710: ICD-10-CM

## 2024-06-13 DIAGNOSIS — Z60.2: ICD-10-CM

## 2024-06-13 DIAGNOSIS — K59.00: Primary | ICD-10-CM

## 2024-06-13 LAB
ALBUMIN SERPL BCP-MCNC: 3.6 G/DL (ref 3.4–5)
ALP SERPL-CCNC: 238 U/L (ref 46–116)
ALT SERPL W P-5'-P-CCNC: 71 U/L (ref 12–78)
APTT PPP: 27 SEC (ref 24.3–34.3)
AST SERPL W P-5'-P-CCNC: 40 U/L (ref 15–37)
BASOPHILS # BLD AUTO: 0.1 K/UL (ref 0–0.2)
BASOPHILS NFR BLD AUTO: 1.2 % (ref 0–2)
BILIRUB DIRECT SERPL-MCNC: 0.1 MG/DL (ref 0–0.2)
BILIRUB SERPL-MCNC: 0.3 MG/DL (ref 0.2–1)
BUN SERPL-MCNC: 16 MG/DL (ref 7–18)
CALCIUM SERPL-MCNC: 9.9 MG/DL (ref 8.5–10.1)
CHLORIDE SERPL-SCNC: 101 MMOL/L (ref 98–107)
CO2 SERPL-SCNC: 30 MMOL/L (ref 21–32)
CREAT SERPL-MCNC: 1 MG/DL (ref 0.6–1.3)
EOSINOPHIL # BLD AUTO: 0.2 K/UL (ref 0–0.7)
EOSINOPHIL NFR BLD AUTO: 3.1 % (ref 0–6)
ERYTHROCYTE [DISTWIDTH] IN BLOOD BY AUTOMATED COUNT: 14.5 % (ref 11.5–15)
GLUCOSE SERPL-MCNC: 105 MG/DL (ref 74–106)
HCT VFR BLD AUTO: 37 % (ref 33–45)
HGB BLD-MCNC: 12.6 G/DL (ref 11.5–14.8)
INR PPP: 0.97 (ref 0.91–1.1)
LIPASE SERPL-CCNC: 37 U/L (ref 16–77)
LYMPHOCYTES NFR BLD AUTO: 2.5 K/UL (ref 0.8–4.8)
LYMPHOCYTES NFR BLD AUTO: 32.3 % (ref 20–44)
MCH RBC QN AUTO: 29 PG (ref 26–33)
MCHC RBC AUTO-ENTMCNC: 34 G/DL (ref 31–36)
MCV RBC AUTO: 85 FL (ref 82–100)
MONOCYTES NFR BLD AUTO: 0.6 K/UL (ref 0.1–1.3)
MONOCYTES NFR BLD AUTO: 7.8 % (ref 2–12)
NEUTROPHILS # BLD AUTO: 4.4 K/UL (ref 1.8–8.9)
NEUTROPHILS NFR BLD AUTO: 55.6 % (ref 43–81)
PLATELET # BLD AUTO: 393 K/UL (ref 150–450)
POTASSIUM SERPL-SCNC: 4.1 MMOL/L (ref 3.5–5.1)
PROT SERPL-MCNC: 8 G/DL (ref 6.4–8.2)
PROTHROMBIN TIME: 10.3 SECS (ref 9.2–11.1)
RBC # BLD AUTO: 4.39 MIL/UL (ref 4–5.2)
SODIUM SERPL-SCNC: 138 MMOL/L (ref 136–145)
WBC NRBC COR # BLD AUTO: 7.9 K/UL (ref 4.3–11)

## 2024-06-13 PROCEDURE — 99285 EMERGENCY DEPT VISIT HI MDM: CPT

## 2024-06-13 PROCEDURE — 36415 COLL VENOUS BLD VENIPUNCTURE: CPT

## 2024-06-13 PROCEDURE — 85730 THROMBOPLASTIN TIME PARTIAL: CPT

## 2024-06-13 PROCEDURE — 85025 COMPLETE CBC W/AUTO DIFF WBC: CPT

## 2024-06-13 PROCEDURE — 74018 RADEX ABDOMEN 1 VIEW: CPT

## 2024-06-13 PROCEDURE — 96374 THER/PROPH/DIAG INJ IV PUSH: CPT

## 2024-06-13 PROCEDURE — 80048 BASIC METABOLIC PNL TOTAL CA: CPT

## 2024-06-13 PROCEDURE — 80076 HEPATIC FUNCTION PANEL: CPT

## 2024-06-13 PROCEDURE — 74176 CT ABD & PELVIS W/O CONTRAST: CPT

## 2024-06-13 PROCEDURE — 83690 ASSAY OF LIPASE: CPT

## 2024-06-13 RX ADMIN — Medication ONE MG: at 06:11

## 2024-06-13 RX ADMIN — LIDOCAINE STA GM: 50 OINTMENT TOPICAL at 08:10

## 2024-06-13 RX ADMIN — MINERAL OIL ONE EA: 100 ENEMA RECTAL at 05:18

## 2024-06-13 RX ADMIN — LACTULOSE ONE G: 10 SOLUTION ORAL at 08:10

## 2024-06-13 SDOH — SOCIAL STABILITY - SOCIAL INSECURITY: PROBLEMS RELATED TO LIVING ALONE: Z60.2
